# Patient Record
Sex: FEMALE | Race: BLACK OR AFRICAN AMERICAN
[De-identification: names, ages, dates, MRNs, and addresses within clinical notes are randomized per-mention and may not be internally consistent; named-entity substitution may affect disease eponyms.]

---

## 2021-10-28 ENCOUNTER — TRANSCRIPTION ENCOUNTER (OUTPATIENT)
Age: 36
End: 2021-10-28

## 2022-02-04 ENCOUNTER — TRANSCRIPTION ENCOUNTER (OUTPATIENT)
Age: 37
End: 2022-02-04

## 2024-06-17 ENCOUNTER — EMERGENCY (EMERGENCY)
Facility: HOSPITAL | Age: 39
LOS: 0 days | Discharge: HOME | End: 2024-06-17
Attending: EMERGENCY MEDICINE
Payer: SELF-PAY

## 2024-06-17 VITALS
TEMPERATURE: 98 F | OXYGEN SATURATION: 100 % | DIASTOLIC BLOOD PRESSURE: 89 MMHG | SYSTOLIC BLOOD PRESSURE: 159 MMHG | HEART RATE: 77 BPM | RESPIRATION RATE: 16 BRPM

## 2024-06-17 VITALS — HEIGHT: 64 IN

## 2024-06-17 DIAGNOSIS — M25.512 PAIN IN LEFT SHOULDER: ICD-10-CM

## 2024-06-17 DIAGNOSIS — Z88.1 ALLERGY STATUS TO OTHER ANTIBIOTIC AGENTS STATUS: ICD-10-CM

## 2024-06-17 PROCEDURE — 73030 X-RAY EXAM OF SHOULDER: CPT | Mod: LT

## 2024-06-17 PROCEDURE — 73030 X-RAY EXAM OF SHOULDER: CPT | Mod: 26,LT

## 2024-06-17 PROCEDURE — 99284 EMERGENCY DEPT VISIT MOD MDM: CPT

## 2024-06-17 PROCEDURE — 99283 EMERGENCY DEPT VISIT LOW MDM: CPT | Mod: 25

## 2024-06-17 RX ORDER — ACETAMINOPHEN 500 MG
1000 TABLET ORAL ONCE
Refills: 0 | Status: COMPLETED | OUTPATIENT
Start: 2024-06-17 | End: 2024-06-17

## 2024-06-17 RX ORDER — IBUPROFEN 200 MG
600 TABLET ORAL ONCE
Refills: 0 | Status: COMPLETED | OUTPATIENT
Start: 2024-06-17 | End: 2024-06-17

## 2024-06-17 RX ORDER — GABAPENTIN 400 MG/1
1 CAPSULE ORAL
Qty: 42 | Refills: 0
Start: 2024-06-17 | End: 2024-06-30

## 2024-06-17 RX ADMIN — Medication 600 MILLIGRAM(S): at 09:46

## 2024-06-17 RX ADMIN — Medication 1000 MILLIGRAM(S): at 09:47

## 2024-06-17 NOTE — ED PROVIDER NOTE - NSFOLLOWUPINSTRUCTIONS_ED_ALL_ED_FT
Shoulder Pain  Many things can cause shoulder pain, including:  An injury.  Moving the shoulder in the same way again and again (overuse).  Joint pain (arthritis).  Pain can come from:  Swelling and irritation (inflammation) of any part of the shoulder.  An injury to:  The shoulder joint.  Tissues that connect muscle to bone (tendons).  Tissues that connect bones to each other (ligaments).  Bones.  Follow these instructions at home:  Watch for changes in your symptoms. Let your doctor know about them. Follow these instructions to help with your pain.    If you have a sling that can be taken off:    Wear the sling as told by your doctor. Take it off only as told by your doctor.  Check the skin around the sling every day. Tell your doctor if you see problems.  Loosen the sling if your fingers:  Tingle.  Become numb.  Become cold.  Keep the sling clean.  If the sling is not waterproof:  Do not let it get wet.  Take the sling off when you shower or bathe.  Managing pain, stiffness, and swelling    Bag of ice on a towel on the skin.  If told, put ice on the painful area.  Put ice in a plastic bag.  Place a towel between your skin and the bag.  Leave the ice on for 20 minutes, 2–3 times a day. Stop putting ice on if it does not help with the pain.  If your skin turns bright red, take off the ice right away to prevent skin damage. The risk of damage is higher if you cannot feel pain, heat, or cold.  Squeeze a soft ball or a foam pad as much as possible. This prevents swelling in the shoulder. It also helps to strengthen the arm.  General instructions    Take over-the-counter and prescription medicines only as told by your doctor.  Keep all follow-up visits. This will help you avoid any type of permanent shoulder problems.  Contact a doctor if:  Your pain gets worse.  Medicine does not help your pain.  You have new pain in your arm, hand, or fingers.  You loosen your sling and your arm, hand, or fingers:  Tingle.  Are numb.  Are swollen.  Get help right away if:  Your arm, hand, or fingers turn white or blue.  This information is not intended to replace advice given to you by your health care provider. Make sure you discuss any questions you have with your health care provider.    Document Revised: 07/21/2023 Document Reviewed: 07/21/2023  Elsevier Patient Education © 2024 [employer]  Elsevier logo  Terms and Conditions  Privacy Policy  Editorial Policy  All content on this site: Copyright © 2024 Elsevier, its licensors, and contributors. All rights are reserved, including those for text and data mining, AI training, and similar technologies. For all open access content, the Creative Commons licensing terms apply.  Cookies are used by this site. To decline or learn more, visit our Cookies page.

## 2024-06-17 NOTE — ED PROVIDER NOTE - OBJECTIVE STATEMENT
38 yo female presents to the ED c/o atraumatic L shoulder/arm pain. Pt states that about 1 month ago she had similar L shoulder pain that lasted about 3 weeks, self resolved, and then returned yesterday. Pt endorsing a tingling pain in the L side of her neck that extends down to the L arm. Also feels an increasing in pain and "heavy feeling" in her LUE when she lowers her L arm.

## 2024-06-17 NOTE — ED ADULT TRIAGE NOTE - CHIEF COMPLAINT QUOTE
PT presents to er with complaints of left shoulder/arm pain, states she cannot bring her arm down to her side, denies trauma/falls/previous injuries, states this happened 3 weeks ago and did not come in for evaluation.

## 2024-06-17 NOTE — ED PROVIDER NOTE - CARE PROVIDERS DIRECT ADDRESSES
[email or web address]

## 2024-06-17 NOTE — ED ADULT NURSE NOTE - SUICIDE SCREENING DEPRESSION
Hydroxyzone: take 1 tablet during the day every 8 hours as needed for itching and take 2 at bedtime for sleep    Please call cardiology re: forearm/wrist pain   Negative

## 2024-06-17 NOTE — ED ADULT NURSE NOTE - OBJECTIVE STATEMENT
PT presents to er with complaints of left shoulder/arm pain, states she cannot bring her arm down to her side, denies trauma/falls/previous injuries, states this happened 3 weeks ago and did not come in for evaluation. no other complaints at this time

## 2024-06-17 NOTE — ED PROVIDER NOTE - PHYSICAL EXAMINATION
Physical Exam:  Gen: NAD, non-toxic appearing, able to ambulate without assistance  Head: NCAT  HEENT: EOMI, PEERLA, normal conjunctiva, tongue midline, oral mucosa moist  Lung: CTAB, no respiratory distress, no wheezes/rhonchi/rales B/L, speaking in full sentences  CV: RRR, no murmurs, rubs or gallops, distal pulses 2+ b/l  Abd: soft, nontender, no distention, no guarding, no rigidity, no rebound tenderness  MSK: no visible deformities, +tenderness to L shoulder, good ROM L shoulder, normal ROM of neck   Skin: Warm, well perfused, no rash  Psych: normal affect, calm

## 2024-06-17 NOTE — ED PROVIDER NOTE - PATIENT PORTAL LINK FT
You can access the FollowMyHealth Patient Portal offered by [facility] by registering at the following website: [email or web address]. By joining [employer]’s FollowMyHealth portal, you will also be able to view your health information using other applications (apps) compatible with our system.

## 2024-06-17 NOTE — ED PROVIDER NOTE - CARE PROVIDER_API CALL
Shelley Kingsley  Orthopaedic Surgery  [address] [place] [address]  Phone: [phone]  Fax: [phone]  Follow Up Time:

## 2024-06-17 NOTE — ED PROVIDER NOTE - CLINICAL SUMMARY MEDICAL DECISION MAKING FREE TEXT BOX
Patient with atraumatic left shoulder pain.  Mild hypertension on arrival.  Patient with reproducible left shoulder tenderness.  X-ray of the shoulder demonstrates no acute fracture or dislocation or other pathology on my independent interpretation.  Limb is neurovascular intact, compartments are soft.  Patient without neck tenderness on exam, has full range of motion of her neck.  Question nerve impingement given tingly nature of her pain.  Patient given Motrin and Tylenol department.  Okay for discharge home at this time, recommend continue Motrin or Tylenol as needed for pain, will add on gabapentin.  Recommend close follow-up with orthopedics.  Strict turn precautions given for any worsening.  Patient verbalized understanding and agrees plan at this time.

## 2024-06-18 ENCOUNTER — EMERGENCY (EMERGENCY)
Facility: HOSPITAL | Age: 39
LOS: 0 days | Discharge: HOME | End: 2024-06-18
Attending: STUDENT IN AN ORGANIZED HEALTH CARE EDUCATION/TRAINING PROGRAM
Payer: SELF-PAY

## 2024-06-18 VITALS
RESPIRATION RATE: 18 BRPM | OXYGEN SATURATION: 100 % | DIASTOLIC BLOOD PRESSURE: 107 MMHG | TEMPERATURE: 99 F | HEIGHT: 64 IN | HEART RATE: 99 BPM | WEIGHT: 174.39 LBS | SYSTOLIC BLOOD PRESSURE: 168 MMHG

## 2024-06-18 VITALS
TEMPERATURE: 98 F | OXYGEN SATURATION: 100 % | HEART RATE: 65 BPM | SYSTOLIC BLOOD PRESSURE: 174 MMHG | DIASTOLIC BLOOD PRESSURE: 101 MMHG | RESPIRATION RATE: 18 BRPM

## 2024-06-18 DIAGNOSIS — M79.622 PAIN IN LEFT UPPER ARM: ICD-10-CM

## 2024-06-18 DIAGNOSIS — R20.2 PARESTHESIA OF SKIN: ICD-10-CM

## 2024-06-18 DIAGNOSIS — M25.512 PAIN IN LEFT SHOULDER: ICD-10-CM

## 2024-06-18 DIAGNOSIS — Z88.1 ALLERGY STATUS TO OTHER ANTIBIOTIC AGENTS STATUS: ICD-10-CM

## 2024-06-18 PROCEDURE — 73200 CT UPPER EXTREMITY W/O DYE: CPT | Mod: 26,LT,MC

## 2024-06-18 PROCEDURE — 76376 3D RENDER W/INTRP POSTPROCES: CPT | Mod: 26

## 2024-06-18 PROCEDURE — 36000 PLACE NEEDLE IN VEIN: CPT

## 2024-06-18 PROCEDURE — 76376 3D RENDER W/INTRP POSTPROCES: CPT

## 2024-06-18 PROCEDURE — 73200 CT UPPER EXTREMITY W/O DYE: CPT | Mod: MC,LT

## 2024-06-18 PROCEDURE — 99284 EMERGENCY DEPT VISIT MOD MDM: CPT

## 2024-06-18 PROCEDURE — 99053 MED SERV 10PM-8AM 24 HR FAC: CPT

## 2024-06-18 PROCEDURE — 99284 EMERGENCY DEPT VISIT MOD MDM: CPT | Mod: 25

## 2024-06-18 PROCEDURE — 72125 CT NECK SPINE W/O DYE: CPT | Mod: MC

## 2024-06-18 PROCEDURE — 72125 CT NECK SPINE W/O DYE: CPT | Mod: 26,MC

## 2024-06-18 RX ORDER — MORPHINE SULFATE 50 MG/1
1 CAPSULE, EXTENDED RELEASE ORAL
Qty: 4 | Refills: 0
Start: 2024-06-18 | End: 2024-06-20

## 2024-06-18 RX ORDER — MORPHINE SULFATE 50 MG/1
15 CAPSULE, EXTENDED RELEASE ORAL ONCE
Refills: 0 | Status: DISCONTINUED | OUTPATIENT
Start: 2024-06-18 | End: 2024-06-18

## 2024-06-18 RX ORDER — DIAZEPAM 5 MG
5 TABLET ORAL ONCE
Refills: 0 | Status: DISCONTINUED | OUTPATIENT
Start: 2024-06-18 | End: 2024-06-18

## 2024-06-18 RX ORDER — ACETAMINOPHEN 500 MG
650 TABLET ORAL ONCE
Refills: 0 | Status: DISCONTINUED | OUTPATIENT
Start: 2024-06-18 | End: 2024-06-18

## 2024-06-18 RX ORDER — IBUPROFEN 200 MG
600 TABLET ORAL ONCE
Refills: 0 | Status: COMPLETED | OUTPATIENT
Start: 2024-06-18 | End: 2024-06-18

## 2024-06-18 RX ORDER — ACETAMINOPHEN 500 MG
650 TABLET ORAL ONCE
Refills: 0 | Status: COMPLETED | OUTPATIENT
Start: 2024-06-18 | End: 2024-06-18

## 2024-06-18 RX ADMIN — Medication 600 MILLIGRAM(S): at 05:49

## 2024-06-18 RX ADMIN — Medication 5 MILLIGRAM(S): at 04:36

## 2024-06-18 RX ADMIN — MORPHINE SULFATE 15 MILLIGRAM(S): 50 CAPSULE, EXTENDED RELEASE ORAL at 05:49

## 2024-06-18 RX ADMIN — Medication 650 MILLIGRAM(S): at 05:49

## 2024-06-18 NOTE — ED PROVIDER NOTE - NSFOLLOWUPINSTRUCTIONS_ED_ALL_ED_FT
** Follow up at the [facility]. Call to make an appointment.   ** Take over the counter Tylenol or Ibuprofen for pain -- follow dosing directions on original bottle.      ** A referral coordinator will reach out to you to help you schedule an appointment.    ** Go to the nearest Emergency Department if you experience any new or concerning symptoms, such as:   - worsening pain  - chest pain  - difficulty breathing  - passing out  - unable to eat or drink  - unable to move or feel part of your body  - fever, chills

## 2024-06-18 NOTE — ED ADULT NURSE NOTE - OBJECTIVE STATEMENT
39y female presented to the ED with complaints of left shoulder pain. Pt states the pain started yesterday out of the blue and has been progressively getting worse. Pt was seen in ED earlier today for similar problem.  Pt states she cant find a comfortable position. able to move shoulder. pt states she has numbness and tingling down arm in to hand. Pt provided with hot packs.

## 2024-06-18 NOTE — ED PROVIDER NOTE - CLINICAL SUMMARY MEDICAL DECISION MAKING FREE TEXT BOX
Valarie Mcmillan MD, Attending  ddx includes, but is not limited to the following: cervical radiculopathy, rotator cuff injury, lower suspicion for occult fracture.   plan: CT C spine an and shoulder, analgesia, reassess.   update: see progress notes.   ----

## 2024-06-18 NOTE — ED PROVIDER NOTE - OBJECTIVE STATEMENT
39-year-old female past medical history of left shoulder pain, presents with left shoulder pain for weeks.  Patient states several months ago she had similar pain but resolved by itself.  Patient endorsed numbness, heaviness of left upper extremity.  Patient was seen earlier in the ED for same pain and discharged with OTC pain meds.  Patient denies chest pain, shortness of breath, cough, fever or chills.  Patient declining to give urine sample for pregnancy test.

## 2024-06-18 NOTE — ED PROVIDER NOTE - PHYSICAL EXAMINATION
Valarie Mcmillan MD, Attending  Gen: Well appearing in NAD   Head: NC/AT  Neck: trachea midline, no midline ttp of C, T, L spine.   Resp:  No distress  Ext: no deformities, severe ttp of L shoulder, without limited ROM.   Neuro:  A&O appears non focal  Skin:  Warm and dry as visualized  Psych:  Normal affect and mood

## 2024-06-18 NOTE — ED PROVIDER NOTE - PATIENT PORTAL LINK FT
You can access the FollowMyHealth Patient Portal offered by [facility] by registering at the following website: [email or web address]. By joining [employer]’s FollowMyHealth portal, you will also be able to view your health information using other applications (apps) compatible with our system.

## 2024-06-20 PROBLEM — Z78.9 OTHER SPECIFIED HEALTH STATUS: Chronic | Status: ACTIVE | Noted: 2024-06-18

## 2024-07-08 NOTE — ED PROVIDER NOTE - NS ED SCRIBE STATEMENT
LOV:  12/01/2023 for: Medication Follow-Up   Patient advised to RTC on:  May 2024.     Medication Quantity Refills Start End   allopurinol 100 MG Oral Tab 90 tablet 1 12/1/2023 --   Sig:   Take 1 tablet (100 mg total) by mouth daily.        Attending

## 2024-07-11 ENCOUNTER — TRANSCRIPTION ENCOUNTER (OUTPATIENT)
Age: 39
End: 2024-07-11

## 2024-07-11 ENCOUNTER — APPOINTMENT (OUTPATIENT)
Dept: ORTHOPEDIC SURGERY | Facility: CLINIC | Age: 39
End: 2024-07-11
Payer: COMMERCIAL

## 2024-07-11 ENCOUNTER — NON-APPOINTMENT (OUTPATIENT)
Age: 39
End: 2024-07-11

## 2024-07-11 DIAGNOSIS — M54.12 RADICULOPATHY, CERVICAL REGION: ICD-10-CM

## 2024-07-11 PROBLEM — Z00.00 ENCOUNTER FOR PREVENTIVE HEALTH EXAMINATION: Status: ACTIVE | Noted: 2024-07-11

## 2024-07-11 PROCEDURE — 99204 OFFICE O/P NEW MOD 45 MIN: CPT

## 2024-07-30 ENCOUNTER — APPOINTMENT (OUTPATIENT)
Dept: PHYSICAL MEDICINE AND REHAB | Facility: CLINIC | Age: 39
End: 2024-07-30
Payer: COMMERCIAL

## 2024-07-30 VITALS
WEIGHT: 180 LBS | HEIGHT: 64 IN | SYSTOLIC BLOOD PRESSURE: 147 MMHG | RESPIRATION RATE: 14 BRPM | DIASTOLIC BLOOD PRESSURE: 85 MMHG | HEART RATE: 84 BPM | BODY MASS INDEX: 30.73 KG/M2

## 2024-07-30 DIAGNOSIS — M75.82 OTHER SHOULDER LESIONS, LEFT SHOULDER: ICD-10-CM

## 2024-07-30 DIAGNOSIS — Z78.9 OTHER SPECIFIED HEALTH STATUS: ICD-10-CM

## 2024-07-30 DIAGNOSIS — M54.12 RADICULOPATHY, CERVICAL REGION: ICD-10-CM

## 2024-07-30 PROCEDURE — 99204 OFFICE O/P NEW MOD 45 MIN: CPT | Mod: GC

## 2024-07-30 PROCEDURE — G2211 COMPLEX E/M VISIT ADD ON: CPT

## 2024-07-30 RX ORDER — METHYLPREDNISOLONE 4 MG/1
4 TABLET ORAL
Qty: 1 | Refills: 0 | Status: ACTIVE | COMMUNITY
Start: 2024-07-30 | End: 1900-01-01
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
## 2024-07-30 NOTE — PHYSICAL EXAM
[FreeTextEntry1] : PE: Constitutional: In NAD, calm and cooperative MSK (Neck/Shoulder) 	Inspection: no gross swelling identified 	Palpation: tenderness to left trapezius and scapula 	ROM: pain at end cervical extension and left cervical rotation, pain with passive shoulder abduction and internal rotation 	Strength: 5/5 strength in bilateral upper and lower extremities 	Reflexes: 2+ Biceps/Brachioradialis reflex, trace left brachioradialis reflex, Hoffmans negative bilaterally 	Sensation: altered sensation to light touch of left lateral forearm, absent sensation to light touch of left thumb 	Special tests: Spurlings test positive on left, carpal tunnel Tinel negative, Phalen negative, cubital tunnel Tinel negative

## 2024-07-30 NOTE — DATA REVIEWED
[FreeTextEntry1] : CT C Spine 6/2024 reviewed and interpreted by me: kyphosis centered around C4-5 seen  ACC: [id number] EXAM: CT CERVICAL SPINE ORDERED BY: SARINA BINGHAM DATE: 06/18/2024    INTERPRETATION: CLINICAL INFORMATION: Neck pain and left arm pain  TECHNIQUE: Noncontrast axial CT images were acquired through the cervical spine. Sagittal and coronal reformats were performed.  COMPARISON STUDY: None.  FINDINGS: There is reversal of the cervical lordosis, which may be related to position and/or spasm. There is no CT evidence of cervical spine fracture or traumatic malalignment. There is no suspicious osseous lesion. There is no paraspinous soft tissue abnormality.  Degenerative changes: No significant degenerative changes, spinal canal stenosis or neuroforaminal narrowing is recognized by CT technique.  Incidental findings: None.  IMPRESSION: No CT evidence of cervical spine fracture or traumatic malalignment.  --- End of Report ---      HEYDI AGUILAR MD; Attending Radiologist This document has been electronically signed. Jun 18 2024 4:35AM

## 2024-08-10 ENCOUNTER — APPOINTMENT (OUTPATIENT)
Dept: MRI IMAGING | Facility: CLINIC | Age: 39
End: 2024-08-10

## 2024-08-21 ENCOUNTER — APPOINTMENT (OUTPATIENT)
Dept: FAMILY MEDICINE | Facility: CLINIC | Age: 39
End: 2024-08-21

## 2024-08-21 VITALS
BODY MASS INDEX: 29.71 KG/M2 | HEART RATE: 81 BPM | WEIGHT: 174 LBS | HEIGHT: 64 IN | TEMPERATURE: 98.5 F | SYSTOLIC BLOOD PRESSURE: 138 MMHG | DIASTOLIC BLOOD PRESSURE: 80 MMHG | OXYGEN SATURATION: 98 %

## 2024-08-21 DIAGNOSIS — F17.290 NICOTINE DEPENDENCE, OTHER TOBACCO PRODUCT, UNCOMPLICATED: ICD-10-CM

## 2024-08-21 DIAGNOSIS — Z80.0 FAMILY HISTORY OF MALIGNANT NEOPLASM OF DIGESTIVE ORGANS: ICD-10-CM

## 2024-08-21 DIAGNOSIS — Z80.3 FAMILY HISTORY OF MALIGNANT NEOPLASM OF BREAST: ICD-10-CM

## 2024-08-21 DIAGNOSIS — M54.12 RADICULOPATHY, CERVICAL REGION: ICD-10-CM

## 2024-08-21 DIAGNOSIS — M75.82 OTHER SHOULDER LESIONS, LEFT SHOULDER: ICD-10-CM

## 2024-08-21 PROCEDURE — G2211 COMPLEX E/M VISIT ADD ON: CPT

## 2024-08-21 PROCEDURE — 99203 OFFICE O/P NEW LOW 30 MIN: CPT

## 2024-08-21 NOTE — HISTORY OF PRESENT ILLNESS
[FreeTextEntry8] : IRENE UMANZOR is a 39 year old female presenting to [facility] care.

## 2024-08-28 ENCOUNTER — APPOINTMENT (OUTPATIENT)
Dept: FAMILY MEDICINE | Facility: CLINIC | Age: 39
End: 2024-08-28
Payer: COMMERCIAL

## 2024-08-28 VITALS
DIASTOLIC BLOOD PRESSURE: 80 MMHG | HEIGHT: 64 IN | BODY MASS INDEX: 29.71 KG/M2 | TEMPERATURE: 98 F | OXYGEN SATURATION: 98 % | SYSTOLIC BLOOD PRESSURE: 132 MMHG | WEIGHT: 174 LBS | HEART RATE: 80 BPM

## 2024-08-28 DIAGNOSIS — Z00.00 ENCOUNTER FOR GENERAL ADULT MEDICAL EXAMINATION W/OUT ABNORMAL FINDINGS: ICD-10-CM

## 2024-08-28 LAB
BILIRUB UR QL STRIP: NORMAL
CLARITY UR: CLEAR
COLLECTION METHOD: NORMAL
GLUCOSE UR-MCNC: NORMAL
HCG UR QL: 0.2 EU/DL
HGB UR QL STRIP.AUTO: ABNORMAL
KETONES UR-MCNC: NORMAL
LEUKOCYTE ESTERASE UR QL STRIP: NORMAL
NITRITE UR QL STRIP: NORMAL
PH UR STRIP: 7
PROT UR STRIP-MCNC: NORMAL
SP GR UR STRIP: 1.01

## 2024-08-28 PROCEDURE — 92552 PURE TONE AUDIOMETRY AIR: CPT

## 2024-08-28 PROCEDURE — 99385 PREV VISIT NEW AGE 18-39: CPT | Mod: 25

## 2024-08-28 PROCEDURE — 99173 VISUAL ACUITY SCREEN: CPT

## 2024-08-28 PROCEDURE — 81003 URINALYSIS AUTO W/O SCOPE: CPT | Mod: QW

## 2024-08-28 NOTE — HEALTH RISK ASSESSMENT
[2 - 3 times a week (3 pts)] : 2 - 3  times a week (3 points) [1 or 2 (0 pts)] : 1 or 2 (0 points) [Never (0 pts)] : Never (0 points) [Yes] : In the past 12 months have you used drugs other than those required for medical reasons? Yes [No falls in past year] : Patient reported no falls in the past year [0] : 2) Feeling down, depressed, or hopeless: Not at all (0) [PHQ-2 Negative - No further assessment needed] : PHQ-2 Negative - No further assessment needed [Never] : Never [NO] : No [Patient reported PAP Smear was normal] : Patient reported PAP Smear was normal [HIV Test offered] : HIV Test offered [Hepatitis C test offered] : Hepatitis C test offered [Alone] : lives alone [Employed] : employed [College] : College [Significant Other] : lives with significant other [Feels Safe at Home] : Feels safe at home [Fully functional (bathing, dressing, toileting, transferring, walking, feeding)] : Fully functional (bathing, dressing, toileting, transferring, walking, feeding) [Fully functional (using the telephone, shopping, preparing meals, housekeeping, doing laundry, using] : Fully functional and needs no help or supervision to perform IADLs (using the telephone, shopping, preparing meals, housekeeping, doing laundry, using transportation, managing medications and managing finances) [Reports changes in vision] : Reports changes in vision [Reports normal functional visual acuity (ie: able to read med bottle)] : Reports normal functional visual acuity [Reports changes in dental health] : Reports changes in dental health [Smoke Detector] : smoke detector [Carbon Monoxide Detector] : carbon monoxide detector [Seat Belt] :  uses seat belt [Audit-CScore] : 3 [[id number]] : 0 [Change in mental status noted] : No change in mental status noted [Language] : denies difficulty with language [Behavior] : denies difficulty with behavior [Learning/Retaining New Information] : denies difficulty learning/retaining new information [Handling Complex Tasks] : denies difficulty handling complex tasks [Reasoning] : denies difficulty with reasoning [Spatial Ability and Orientation] : denies difficulty with spatial ability and orientation [Sexually Active] : not sexually active [Reports changes in hearing] : Reports no changes in hearing [Sunscreen] : does not use sunscreen [Travel to Developing Areas] : does not  travel to developing areas [TB Exposure] : is not being exposed to tuberculosis [PapSmearDate] : 01/24

## 2024-08-28 NOTE — PHYSICAL EXAM

## 2024-09-12 ENCOUNTER — TRANSCRIPTION ENCOUNTER (OUTPATIENT)
Age: 39
End: 2024-09-12

## 2024-09-13 ENCOUNTER — TRANSCRIPTION ENCOUNTER (OUTPATIENT)
Age: 39
End: 2024-09-13

## 2025-09-10 ENCOUNTER — APPOINTMENT (OUTPATIENT)
Dept: FAMILY MEDICINE | Facility: CLINIC | Age: 40
End: 2025-09-10